# Patient Record
Sex: MALE | Race: BLACK OR AFRICAN AMERICAN | URBAN - METROPOLITAN AREA
[De-identification: names, ages, dates, MRNs, and addresses within clinical notes are randomized per-mention and may not be internally consistent; named-entity substitution may affect disease eponyms.]

---

## 2020-07-09 ENCOUNTER — DOCUMENTATION ONLY (OUTPATIENT)
Dept: SLEEP MEDICINE | Age: 55
End: 2020-07-09

## 2020-07-09 NOTE — PROGRESS NOTES
Tried to call patient to obtain paperwork for VV, unable to leave VM at [phone] and [phone] nonworking #.  Appt cancelled tch

## 2020-07-16 ENCOUNTER — VIRTUAL VISIT (OUTPATIENT)
Dept: SLEEP MEDICINE | Age: 55
End: 2020-07-16

## 2020-07-17 ENCOUNTER — DOCUMENTATION ONLY (OUTPATIENT)
Dept: SLEEP MEDICINE | Age: 55
End: 2020-07-17